# Patient Record
(demographics unavailable — no encounter records)

---

## 2024-10-14 NOTE — ASSESSMENT
[FreeTextEntry1] : 68 year old M with HTN, HLD, DM, smoker who presents for cardiac evaluation of SOB on exertion and CP.  Pt reports 4-5 months of intermittent chest tightness after walking for 4-5 blocks. He has no symptoms at rest. He also reports SOB on exertion. He denies palpitation, dizziness, LOC, orthopnea, PND, or LE edema. He follows with Dr. Blankenship and Dr. Padilla (endocrine) who prescribes his medications. He is seeing Dr. Blankenship today for f/u. He remembers more than 20 years ago, he had a LHC for chest pain but it did not show any blockages  1) Chest discomfort, with exertion 2) SOB on exertion 3) HTN, HLD, DM - EKG showed sinus rhythm without ischemic changes - Given his risk factors and typical symptoms, we discussed undergoing CCTA to assess for CAD. However, pt states he would like to try exercising/walking on treadmill first. I advised pt to undergo treadmill stress echo to r/o ischemia. If he has chest discomfort/abnormalities during stress test, he'll likely need LHC - He is unsure what medications he is taking currently. Patient states he will bring in when he returns for stress testing.  4) Follow-up, pending stress echo

## 2024-10-14 NOTE — HISTORY OF PRESENT ILLNESS
[FreeTextEntry1] : 68 year old M with HTN, HLD, DM, smoker who presents for cardiac evaluation of SOB on exertion and CP.  Meds: ASA 81, losartan 25, ?lopid 600 BID, ?metformin 500 BID EKG showed sinus rhythm without ischemic changes  Pt reports 4-5 months of intermittent chest tightness after walking for 4-5 blocks. He has no symptoms at rest. He also reports SOB on exertion. He denies palpitation, dizziness, LOC, orthopnea, PND, or LE edema. He follows with Dr. Blankenship and Dr. Padilla (endocrine) who prescribes his medications. He is seeing Dr. Blankenship today for f/u. He remembers more than 20 years ago, he had a LHC for chest pain but it did not show any blockages